# Patient Record
Sex: FEMALE | Race: BLACK OR AFRICAN AMERICAN | Employment: OTHER | URBAN - METROPOLITAN AREA
[De-identification: names, ages, dates, MRNs, and addresses within clinical notes are randomized per-mention and may not be internally consistent; named-entity substitution may affect disease eponyms.]

---

## 2023-11-07 ENCOUNTER — HOSPITAL ENCOUNTER (OUTPATIENT)
Facility: HOSPITAL | Age: 73
Discharge: HOME | End: 2023-11-10
Payer: MEDICARE

## 2023-11-07 ENCOUNTER — TRANSCRIBE ORDERS (OUTPATIENT)
Facility: HOSPITAL | Age: 73
End: 2023-11-07

## 2023-11-07 ENCOUNTER — APPOINTMENT (OUTPATIENT)
Facility: HOSPITAL | Age: 73
End: 2023-11-07
Payer: MEDICARE

## 2023-11-07 DIAGNOSIS — Z12.11 SPECIAL SCREENING FOR MALIGNANT NEOPLASMS, COLON: Primary | ICD-10-CM

## 2023-11-07 DIAGNOSIS — Z12.11 SPECIAL SCREENING FOR MALIGNANT NEOPLASMS, COLON: ICD-10-CM

## 2023-11-07 LAB
ANION GAP SERPL CALC-SCNC: 5 MMOL/L (ref 3–18)
BASOPHILS # BLD: 0 K/UL (ref 0–0.1)
BASOPHILS NFR BLD: 0 % (ref 0–2)
BUN SERPL-MCNC: 21 MG/DL (ref 7–18)
BUN/CREAT SERPL: 22 (ref 12–20)
CALCIUM SERPL-MCNC: 9.2 MG/DL (ref 8.5–10.1)
CHLORIDE SERPL-SCNC: 111 MMOL/L (ref 100–111)
CO2 SERPL-SCNC: 29 MMOL/L (ref 21–32)
CREAT SERPL-MCNC: 0.97 MG/DL (ref 0.6–1.3)
DIFFERENTIAL METHOD BLD: ABNORMAL
EOSINOPHIL # BLD: 0.1 K/UL (ref 0–0.4)
EOSINOPHIL NFR BLD: 1 % (ref 0–5)
ERYTHROCYTE [DISTWIDTH] IN BLOOD BY AUTOMATED COUNT: 13 % (ref 11.6–14.5)
GLUCOSE SERPL-MCNC: 99 MG/DL (ref 74–99)
HCT VFR BLD AUTO: 35.2 % (ref 35–45)
HGB BLD-MCNC: 11.2 G/DL (ref 12–16)
IMM GRANULOCYTES # BLD AUTO: 0 K/UL (ref 0–0.04)
IMM GRANULOCYTES NFR BLD AUTO: 0 % (ref 0–0.5)
LYMPHOCYTES # BLD: 2.3 K/UL (ref 0.9–3.6)
LYMPHOCYTES NFR BLD: 48 % (ref 21–52)
MCH RBC QN AUTO: 30 PG (ref 24–34)
MCHC RBC AUTO-ENTMCNC: 31.8 G/DL (ref 31–37)
MCV RBC AUTO: 94.4 FL (ref 78–100)
MONOCYTES # BLD: 0.4 K/UL (ref 0.05–1.2)
MONOCYTES NFR BLD: 9 % (ref 3–10)
NEUTS SEG # BLD: 2 K/UL (ref 1.8–8)
NEUTS SEG NFR BLD: 41 % (ref 40–73)
NRBC # BLD: 0 K/UL (ref 0–0.01)
NRBC BLD-RTO: 0 PER 100 WBC
PLATELET # BLD AUTO: 262 K/UL (ref 135–420)
PMV BLD AUTO: 10.8 FL (ref 9.2–11.8)
POTASSIUM SERPL-SCNC: 4.6 MMOL/L (ref 3.5–5.5)
RBC # BLD AUTO: 3.73 M/UL (ref 4.2–5.3)
SODIUM SERPL-SCNC: 145 MMOL/L (ref 136–145)
WBC # BLD AUTO: 4.8 K/UL (ref 4.6–13.2)

## 2023-11-07 PROCEDURE — 93005 ELECTROCARDIOGRAM TRACING: CPT

## 2023-11-07 PROCEDURE — 36415 COLL VENOUS BLD VENIPUNCTURE: CPT

## 2023-11-07 PROCEDURE — 80048 BASIC METABOLIC PNL TOTAL CA: CPT

## 2023-11-07 PROCEDURE — 85025 COMPLETE CBC W/AUTO DIFF WBC: CPT

## 2023-11-09 LAB
EKG ATRIAL RATE: 87 BPM
EKG DIAGNOSIS: NORMAL
EKG P AXIS: 74 DEGREES
EKG P-R INTERVAL: 182 MS
EKG Q-T INTERVAL: 370 MS
EKG QRS DURATION: 72 MS
EKG QTC CALCULATION (BAZETT): 445 MS
EKG R AXIS: 47 DEGREES
EKG T AXIS: 61 DEGREES
EKG VENTRICULAR RATE: 87 BPM

## 2023-12-08 ENCOUNTER — HOSPITAL ENCOUNTER (OUTPATIENT)
Facility: HOSPITAL | Age: 73
Setting detail: OUTPATIENT SURGERY
Discharge: HOME | End: 2023-12-08
Attending: SURGERY | Admitting: SURGERY
Payer: MEDICARE

## 2023-12-08 ENCOUNTER — ANESTHESIA (OUTPATIENT)
Facility: HOSPITAL | Age: 73
End: 2023-12-08
Payer: MEDICARE

## 2023-12-08 ENCOUNTER — ANESTHESIA EVENT (OUTPATIENT)
Facility: HOSPITAL | Age: 73
End: 2023-12-08
Payer: MEDICARE

## 2023-12-08 VITALS
BODY MASS INDEX: 24.56 KG/M2 | DIASTOLIC BLOOD PRESSURE: 65 MMHG | HEART RATE: 74 BPM | TEMPERATURE: 97.7 F | RESPIRATION RATE: 17 BRPM | HEIGHT: 66 IN | OXYGEN SATURATION: 100 % | WEIGHT: 152.8 LBS | SYSTOLIC BLOOD PRESSURE: 124 MMHG

## 2023-12-08 PROCEDURE — 88305 TISSUE EXAM BY PATHOLOGIST: CPT

## 2023-12-08 PROCEDURE — 6360000002 HC RX W HCPCS: Performed by: ANESTHESIOLOGY

## 2023-12-08 PROCEDURE — 2580000003 HC RX 258: Performed by: SURGERY

## 2023-12-08 PROCEDURE — 3600007502: Performed by: SURGERY

## 2023-12-08 PROCEDURE — 2709999900 HC NON-CHARGEABLE SUPPLY: Performed by: SURGERY

## 2023-12-08 PROCEDURE — 3700000001 HC ADD 15 MINUTES (ANESTHESIA): Performed by: SURGERY

## 2023-12-08 PROCEDURE — 3700000000 HC ANESTHESIA ATTENDED CARE: Performed by: SURGERY

## 2023-12-08 PROCEDURE — 3600007512: Performed by: SURGERY

## 2023-12-08 PROCEDURE — 2500000003 HC RX 250 WO HCPCS: Performed by: ANESTHESIOLOGY

## 2023-12-08 PROCEDURE — 7100000010 HC PHASE II RECOVERY - FIRST 15 MIN: Performed by: SURGERY

## 2023-12-08 RX ORDER — SODIUM CHLORIDE 9 MG/ML
INJECTION, SOLUTION INTRAVENOUS CONTINUOUS
Status: DISCONTINUED | OUTPATIENT
Start: 2023-12-08 | End: 2023-12-08 | Stop reason: HOSPADM

## 2023-12-08 RX ORDER — LOSARTAN POTASSIUM 25 MG/1
1 TABLET ORAL DAILY
COMMUNITY
Start: 2021-07-27

## 2023-12-08 RX ORDER — MAGNESIUM 30 MG
30 TABLET ORAL 2 TIMES DAILY
COMMUNITY

## 2023-12-08 RX ORDER — RIBOFLAVIN (VITAMIN B2) 100 MG
500 TABLET ORAL
COMMUNITY

## 2023-12-08 RX ORDER — PROPOFOL 10 MG/ML
INJECTION, EMULSION INTRAVENOUS PRN
Status: DISCONTINUED | OUTPATIENT
Start: 2023-12-08 | End: 2023-12-08 | Stop reason: SDUPTHER

## 2023-12-08 RX ORDER — LIDOCAINE HYDROCHLORIDE 20 MG/ML
INJECTION, SOLUTION EPIDURAL; INFILTRATION; INTRACAUDAL; PERINEURAL PRN
Status: DISCONTINUED | OUTPATIENT
Start: 2023-12-08 | End: 2023-12-08 | Stop reason: SDUPTHER

## 2023-12-08 RX ORDER — EZETIMIBE 10 MG/1
1 TABLET ORAL DAILY
COMMUNITY
Start: 2020-02-03

## 2023-12-08 RX ORDER — NICOTINE POLACRILEX 2 MG
GUM BUCCAL
COMMUNITY

## 2023-12-08 RX ORDER — ASPIRIN 81 MG/1
TABLET ORAL DAILY
COMMUNITY

## 2023-12-08 RX ORDER — ATORVASTATIN CALCIUM 10 MG/1
10 TABLET, FILM COATED ORAL NIGHTLY
COMMUNITY

## 2023-12-08 RX ADMIN — PROPOFOL 100 MG: 10 INJECTION, EMULSION INTRAVENOUS at 12:27

## 2023-12-08 RX ADMIN — SODIUM CHLORIDE: 9 INJECTION, SOLUTION INTRAVENOUS at 11:58

## 2023-12-08 RX ADMIN — LIDOCAINE HYDROCHLORIDE 60 MG: 20 INJECTION, SOLUTION EPIDURAL; INFILTRATION; INTRACAUDAL; PERINEURAL at 12:27

## 2023-12-08 ASSESSMENT — PAIN - FUNCTIONAL ASSESSMENT: PAIN_FUNCTIONAL_ASSESSMENT: 0-10

## 2023-12-08 NOTE — ANESTHESIA POSTPROCEDURE EVALUATION
Department of Anesthesiology  Postprocedure Note    Patient: Chago Amos  MRN: [id number]  YOB: 1950  Date of evaluation: 12/8/2023      Procedure Summary       Date: 12/08/23 Room / Location: THE [facility] ENDO 01 / [facility] ENDOSCOPY    Anesthesia Start: 3455 Anesthesia Stop: [phone]    Procedure: COLONOSCOPY; POLYPECTOMY; (Lower GI Region) Diagnosis:       Special screening for malignant neoplasms, colon      (Special screening for malignant neoplasms, colon [Z12.11])    Surgeons: Koko Smith MD Responsible Provider: Dari Roland MD    Anesthesia Type: MAC ASA Status: 2            Anesthesia Type: No value filed.     Venecia Phase I: Venecia Score: 10    Venecia Phase II: Venecia Score: 8      Anesthesia Post Evaluation    Patient location during evaluation: bedside  Patient participation: complete - patient participated  Level of consciousness: awake  Airway patency: patent  Nausea & Vomiting: no nausea and no vomiting  Complications: no  Cardiovascular status: hemodynamically stable  Respiratory status: acceptable  Hydration status: stable  Pain management: satisfactory to patient

## 2023-12-08 NOTE — H&P
History of Present Illness  1. Colon Cancer Screen   Prior screening:  colonoscopy. Risk Factors: family history - sibling and family history - parent. Pertinent negatives include abdominal pain, change in bowel habits, change in stool caliber, constipation, decreased appetite, diarrhea, melena, nausea, rectal bleeding, vomiting, weight gain and weight loss. Additional information: No family history of colon cancer, No family history of Crohn's/colitis, NSAID/ASA use and Takes ASA every other day. Problem List  Problem List reviewed. Problem Description  Onset Date  Chronic  Clinical Status  Notes  Anemia  2011  Y      Pure hypercholesterolemia  2011  Y      Impaired fasting glycemia  2021  N      Elevated blood pressure  2021  N      Benign hypertension  2021  N          Past Medical/Surgical History   (Detailed)  Disease/disorder  Onset Date  Management  Date  Comments  Pregnancy-full term  [id number]   - Full term  [id number]    Pregnancy-spontaneous   [id number]  Spontaneous   [id number]        TL          Right breast biopsy X2      Hyperlipidemia              Gynecologic History  Patient is postmenopausal.   Postmenopausal age: 53. Menopause occurred in .  Type of menopause is natural .    Active Medications (Prior to visit today)  Medication Name  Sig Description  Start Date  Stop Date  Refilled  Rx Elsewhere  aspirin 81 mg tablet,delayed release  take 1 tablet (81MG)  by oral route  every day  2014  N  multivitamin tablet  take 1 tablet by oral route  every day with food  //      Y  Vitamin D3 125 mcg (5,000 unit) tablet  once a day  //      Y  Vitamin C 1,000 mg tablet  once a day  //      Y  zinc 50 mg tablet    //      Y  prednisone 20 mg tablet  take 1 tablet by oral route 2 times every day  2023      N  losartan 25 mg tablet  TAKE 1 TABLET DAILY  2023  N  ezetimibe 10 mg tablet  TAKE 1 TABLET

## 2023-12-08 NOTE — OP NOTE
Colonoscopy Procedure Note    Indications: Routine screening    Anesthesia/Sedation: MAC anesthesia Propofol    Pre-Procedure Exam:  Airway: clear   Heart: normal S1and S2    Lungs: clear bilateral  Abdomen: soft, nontender, bowel sounds present and normal in all quadrants   Mental Status: awake, alert, and oriented to person, place, and time      Procedure in Detail:  Informed consent was obtained for the procedure, including sedation. Risks of perforation, hemorrhage, adverse drug reaction, and aspiration were discussed. The patient was placed in the left lateral decubitus position. Based on the pre-procedure assessment, including review of the patient's medical history, medications, allergies, and review of systems, he had been deemed to be an appropriate candidate for moderate sedation; he was therefore sedated with the medications listed above. The patient was monitored continuously with ECG tracing, pulse oximetry, blood pressure monitoring, and direct observations. A rectal examination was performed. The [id number] was inserted into the rectum and advanced under direct vision to the cecum, which was identified by the ileocecal valve and appendiceal orifice. The quality of the colonic preparation was good. A careful inspection was made as the colonoscope was withdrawn, including a retroflexed view of the rectum; findings and interventions are described below. Appropriate photodocumentation was obtained. Findings:   Anus:      -Hemorrhoids  Rectum:   Normal  Sigmoid:       -Protruding lesions:     -Sessile Polyp(s) size 3 mm removed by polypectomy (cold snare)  Descending Colon:   Normal  Transverse Colon:   Normal  Ascending Colon:   Normal  Cecum:   Normal  Terminal Ileum:   Not entered    Specimens: [unfilled]     EBL: Minimal    Complications: None; patient tolerated the procedure well. Attending Attestation: I performed the procedure.     Assistant:  * No surgical staff found

## 2023-12-08 NOTE — BRIEF OP NOTE
Brief Postoperative Note      Patient: Geetha Chavez  YOB: 1950  MRN: [id number]    Date of Procedure: 12/8/2023    Pre-Op Diagnosis Codes: * Special screening for malignant neoplasms, colon [Z12.11]    Post-Op Diagnosis: Same       Procedure(s):  COLONOSCOPY; POLYPECTOMY;    Surgeon(s):   Preethi Manzano MD    Assistant:  * No surgical staff found *    Anesthesia: Monitor Anesthesia Care    Estimated Blood Loss (mL): Minimal    Complications: None    Specimens:   ID Type Source Tests Collected by Time Destination   1 :   Sigmoid Colon  Preethi Manzano MD 12/8/2023 1243        Implants:  * No implants in log *      Drains: * No LDAs found *    Findings: polyps/tics      Electronically signed by Preethi Manzano MD on 12/8/2023 at 12:43 PM

## (undated) DEVICE — NEEDLE FLTR 18GA L1.5IN MEM THK5UM BLNT DISP

## (undated) DEVICE — BLUNTFILL: Brand: MONOJECT

## (undated) DEVICE — SYRINGE 50ML E/T

## (undated) DEVICE — SOLUTION IV 1000ML 20MEQ K CHL IN 0.9% SOD CHL FLX CONT

## (undated) DEVICE — TUBING, SUCTION, 1/4" X 12', STRAIGHT: Brand: MEDLINE

## (undated) DEVICE — CATHETER PH SUCT 14FR

## (undated) DEVICE — FORCEPS BX CAP 240CM L RAD JAW 4

## (undated) DEVICE — Device

## (undated) DEVICE — CANNULA CUSH AD W/ 14FT TBG

## (undated) DEVICE — TRAP SPEC POLYP REM STRNR CLN DSGN MAGNIFYING WIND DISP

## (undated) DEVICE — SNARE POLYP SM W13MMXL240CM SHTH DIA2.4MM OVL FLX DISP

## (undated) DEVICE — SINGLE PORT MANIFOLD: Brand: NEPTUNE 2

## (undated) DEVICE — KENDALL RADIOLUCENT FOAM MONITORING ELECTRODE RECTANGULAR SHAPE: Brand: KENDALL

## (undated) DEVICE — WRISTBAND ID AD W2.5XL9.5CM RED VYN ADH CLSR UNI-PRINT

## (undated) DEVICE — SYRINGE MEDICAL 3ML CLEAR PLASTIC STANDARD NON CONTROL LUERLOCK TIP DISPOSABLE

## (undated) DEVICE — TOURNIQUET PHLEB W1XL18IN BLU FLAT RL AND BND REUSE FOR IV

## (undated) DEVICE — CATHETER IV 22GA L1IN BLU POLYUR STR HUB RADPQ PROTCT +

## (undated) DEVICE — SYRINGE MED 5ML STD CLR PLAS LUERLOCK TIP N CTRL DISP